# Patient Record
Sex: MALE | Race: WHITE | Employment: FULL TIME | URBAN - NONMETROPOLITAN AREA
[De-identification: names, ages, dates, MRNs, and addresses within clinical notes are randomized per-mention and may not be internally consistent; named-entity substitution may affect disease eponyms.]

---

## 2023-05-04 ENCOUNTER — OFFICE VISIT (OUTPATIENT)
Dept: PRIMARY CARE CLINIC | Age: 28
End: 2023-05-04

## 2023-05-04 VITALS
SYSTOLIC BLOOD PRESSURE: 126 MMHG | OXYGEN SATURATION: 98 % | DIASTOLIC BLOOD PRESSURE: 74 MMHG | HEART RATE: 70 BPM | RESPIRATION RATE: 16 BRPM | BODY MASS INDEX: 26.68 KG/M2 | HEIGHT: 72 IN | WEIGHT: 197 LBS

## 2023-05-04 DIAGNOSIS — M25.50 ARTHRALGIA, UNSPECIFIED JOINT: ICD-10-CM

## 2023-05-04 DIAGNOSIS — R10.11 RIGHT UPPER QUADRANT ABDOMINAL PAIN: ICD-10-CM

## 2023-05-04 DIAGNOSIS — Z00.8 ENCOUNTER FOR BIOMETRIC SCREENING: ICD-10-CM

## 2023-05-04 DIAGNOSIS — R53.83 FATIGUE, UNSPECIFIED TYPE: Primary | ICD-10-CM

## 2023-05-04 LAB
ALBUMIN SERPL BCG-MCNC: 5 G/DL (ref 3.5–5.1)
ALP SERPL-CCNC: 73 U/L (ref 38–126)
ALT SERPL W/O P-5'-P-CCNC: 37 U/L (ref 11–66)
AST SERPL-CCNC: 30 U/L (ref 5–40)
BASOPHILS ABSOLUTE: 0 THOU/MM3 (ref 0–0.1)
BASOPHILS NFR BLD AUTO: 0.5 %
BILIRUB CONJ SERPL-MCNC: < 0.2 MG/DL (ref 0–0.3)
BILIRUB SERPL-MCNC: 0.4 MG/DL (ref 0.3–1.2)
CHOLEST SERPL-MCNC: 215 MG/DL (ref 100–199)
CRP SERPL-MCNC: < 0.3 MG/DL (ref 0–1)
DEPRECATED RDW RBC AUTO: 41.7 FL (ref 35–45)
EOSINOPHIL NFR BLD AUTO: 1 %
EOSINOPHILS ABSOLUTE: 0.1 THOU/MM3 (ref 0–0.4)
ERYTHROCYTE [DISTWIDTH] IN BLOOD BY AUTOMATED COUNT: 12.6 % (ref 11.5–14.5)
ERYTHROCYTE [SEDIMENTATION RATE] IN BLOOD BY WESTERGREN METHOD: 2 MM/HR (ref 0–10)
HCT VFR BLD AUTO: 51.3 % (ref 42–52)
HDLC SERPL-MCNC: 58 MG/DL
HGB BLD-MCNC: 17.2 GM/DL (ref 14–18)
IMM GRANULOCYTES # BLD AUTO: 0.02 THOU/MM3 (ref 0–0.07)
IMM GRANULOCYTES NFR BLD AUTO: 0.2 %
LDLC SERPL CALC-MCNC: 134 MG/DL
LYMPHOCYTES ABSOLUTE: 1.9 THOU/MM3 (ref 1–4.8)
LYMPHOCYTES NFR BLD AUTO: 20.7 %
MCH RBC QN AUTO: 30.4 PG (ref 26–33)
MCHC RBC AUTO-ENTMCNC: 33.5 GM/DL (ref 32.2–35.5)
MCV RBC AUTO: 90.8 FL (ref 80–94)
MONOCYTES ABSOLUTE: 0.7 THOU/MM3 (ref 0.4–1.3)
MONOCYTES NFR BLD AUTO: 7.2 %
NEUTROPHILS NFR BLD AUTO: 70.4 %
NRBC BLD AUTO-RTO: 0 /100 WBC
PLATELET # BLD AUTO: 307 THOU/MM3 (ref 130–400)
PMV BLD AUTO: 10.2 FL (ref 9.4–12.4)
PROT SERPL-MCNC: 7.9 G/DL (ref 6.1–8)
RBC # BLD AUTO: 5.65 MILL/MM3 (ref 4.7–6.1)
SEGMENTED NEUTROPHILS ABSOLUTE COUNT: 6.5 THOU/MM3 (ref 1.8–7.7)
TRIGL SERPL-MCNC: 117 MG/DL (ref 0–199)
TSH SERPL DL<=0.005 MIU/L-ACNC: 1.29 UIU/ML (ref 0.4–4.2)
WBC # BLD AUTO: 9.2 THOU/MM3 (ref 4.8–10.8)

## 2023-05-04 RX ORDER — PREDNISONE 20 MG/1
40 TABLET ORAL DAILY
Qty: 10 TABLET | Refills: 0 | Status: SHIPPED | OUTPATIENT
Start: 2023-05-04 | End: 2023-05-09

## 2023-05-05 LAB
HAV IGM SER QL: NEGATIVE
HBV CORE IGM SERPL QL IA: NEGATIVE
HBV SURFACE AG SERPL QL IA: NEGATIVE
HCV IGG SERPL QL IA: NEGATIVE
RHEUMATOID FACT SERPL-ACNC: < 10 IU/ML (ref 0–13)
RPR SER QL: NONREACTIVE

## 2023-05-05 ASSESSMENT — ENCOUNTER SYMPTOMS
BACK PAIN: 1
CONSTIPATION: 0
CHEST TIGHTNESS: 0
PHOTOPHOBIA: 0
ABDOMINAL PAIN: 0
COLOR CHANGE: 0
SHORTNESS OF BREATH: 0
DIARRHEA: 0

## 2023-05-11 ENCOUNTER — OFFICE VISIT (OUTPATIENT)
Dept: PRIMARY CARE CLINIC | Age: 28
End: 2023-05-11

## 2023-05-11 VITALS
HEIGHT: 72 IN | HEART RATE: 77 BPM | BODY MASS INDEX: 26.68 KG/M2 | WEIGHT: 197 LBS | DIASTOLIC BLOOD PRESSURE: 74 MMHG | SYSTOLIC BLOOD PRESSURE: 118 MMHG | RESPIRATION RATE: 16 BRPM | OXYGEN SATURATION: 97 %

## 2023-05-11 DIAGNOSIS — M25.562 ACUTE PAIN OF BOTH KNEES: Primary | ICD-10-CM

## 2023-05-11 DIAGNOSIS — M25.561 ACUTE PAIN OF BOTH KNEES: Primary | ICD-10-CM

## 2023-05-11 RX ORDER — NAPROXEN 250 MG/1
250 TABLET ORAL 2 TIMES DAILY WITH MEALS
Qty: 60 TABLET | Refills: 0 | Status: SHIPPED | OUTPATIENT
Start: 2023-05-11

## 2023-05-11 ASSESSMENT — ENCOUNTER SYMPTOMS
DIARRHEA: 0
CHEST TIGHTNESS: 0
COLOR CHANGE: 0
PHOTOPHOBIA: 0
CONSTIPATION: 0
ABDOMINAL PAIN: 0
SHORTNESS OF BREATH: 0
BACK PAIN: 1

## 2023-05-11 NOTE — PROGRESS NOTES
Albumin 3.5 - 5.1 g/dL 5.0   Alk Phos 38 - 126 U/L 73   ALT 11 - 66 U/L 37   AST 5 - 40 U/L 30   BILIRUBIN TOTAL 0.3 - 1.2 mg/dL 0.4   Bilirubin, Direct 0.0 - 0.3 mg/dL <0.2   TSH 0.400 - 4.200 uIU/mL 1.290   WBC 4.8 - 10.8 thou/mm3 9.2   RBC 4.70 - 6.10 mill/mm3 5.65   Hemoglobin Quant 14.0 - 18.0 gm/dl 17.2   Hematocrit 42.0 - 52.0 % 51.3   MCV 80.0 - 94.0 fL 90.8   MCH 26.0 - 33.0 pg 30.4   MCHC 32.2 - 35.5 gm/dl 33.5   MPV 9.4 - 12.4 fL 10.2   RDW-CV 11.5 - 14.5 % 12.6   RDW-SD 35.0 - 45.0 fL 41.7   Platelet Count 724 - 400 thou/mm3 307   Lymphocytes Absolute 1.0 - 4.8 thou/mm3 1.9   Monocytes Absolute 0.4 - 1.3 thou/mm3 0.7   Eosinophils Absolute 0.0 - 0.4 thou/mm3 0.1   Basophils Absolute 0.0 - 0.1 thou/mm3 0.0   Seg Neutrophils % 70.4   Segs Absolute 1.8 - 7.7 thou/mm3 6.5   Lymphocytes % 20.7   Monocytes % 7.2   Eosinophils % 1.0   Basophils % 0.5   Immature Grans (Abs) 0.00 - 0.07 thou/mm3 0.02   Immature Granulocytes % 0.2   Nucleated Red Blood Cells /100 wbc 0   Sed Rate 0 - 10 mm/hr 2   Rheumatoid Factor 0 - 13 IU/mL < 10   Hep A IgM  Negative   Hepatitis B Surface Ag  Negative   Hepatitis C Ab  Negative   Hep B Core Ab, IgM  Negative   (H): Data is abnormally high                  Return in about 10 days (around 5/21/2023).   Orders Placed This Encounter   Procedures    XR KNEE RIGHT (3 VIEWS)     Standing Status:   Future     Standing Expiration Date:   5/11/2024     Order Specific Question:   Reason for exam:     Answer:   b knee pain    XR KNEE LEFT (3 VIEWS)     Standing Status:   Future     Standing Expiration Date:   6/11/2023     Order Specific Question:   Reason for exam:     Answer:   B knee pain           Electronically signed by SHREYA Dotson CNP on 5/11/2023 at 3:53 PM

## 2023-05-11 NOTE — PATIENT INSTRUCTIONS
All labs were essentially normal in addition to the ER workup  With persistent B joint pain will image look for any acute or degenerative changes  Discussed  adjusting anti inflammatory meds  No other nsaids with the use of the naprosyn  Taake the naprosyn with food

## 2023-05-16 ENCOUNTER — TELEPHONE (OUTPATIENT)
Dept: PRIMARY CARE CLINIC | Age: 28
End: 2023-05-16

## 2023-05-16 DIAGNOSIS — R29.2 HYPER REFLEXIA: ICD-10-CM

## 2023-05-16 DIAGNOSIS — M25.561 ACUTE PAIN OF BOTH KNEES: Primary | ICD-10-CM

## 2023-05-16 DIAGNOSIS — R20.2 PARESTHESIAS: ICD-10-CM

## 2023-05-16 DIAGNOSIS — M25.562 ACUTE PAIN OF BOTH KNEES: Primary | ICD-10-CM

## 2023-05-16 DIAGNOSIS — R29.898 LEG WEAKNESS, BILATERAL: ICD-10-CM

## 2023-05-16 NOTE — TELEPHONE ENCOUNTER
Patient stopped in requesting FMLA intermittent for his current condition. Patient was recently admitted and had several labs/imaging completed. Records pulled from Care everywhere. Patient okay with Neurology referral as discussed at the window.

## 2023-05-16 NOTE — PROGRESS NOTES
Urgent referral to neurology for progressing symptoms of leg weakness  Primary care work up in additional to hospital work up did not yield any significant dx  His leg weakness is becoming worse and making it difficult to ambulate. He has had serial ct and imaging as well as significant lab analysis for autoimmune, viral and metabolic conditions with no identifiable pathology    Pt needs additional Specialized evaluation possibly needle emg or muscle testing to determine cause of his b leg weakness. rule out neuromuscular disorder    Mri of spine was insignificant for acute pathology  Head imaging did not reveal any significant pathology or disease process  and labs are essentially normal with exception of low vitamin d. Pt was advised to seek FMLA so tht he can cover his illness and absences from work related to his limb weakness that is progressing  This started a few months ago, he presented for evaluation . 5/4 initiated work up started on NSAIDs and prednisone  due to what he reported as severe knee pain  He additionally had er work up before my initial new pt assessment for ruq abdominal pain      Today His gait and ambulation is ataxic. He was discharged from \[facility]\"" and pt ordered. Feel needs additional urgent work up at this time.         He was also advised to return  ER for any residual deficits worsening or progressing of symptoms

## 2023-05-23 ENCOUNTER — OFFICE VISIT (OUTPATIENT)
Dept: PRIMARY CARE CLINIC | Age: 28
End: 2023-05-23

## 2023-05-23 VITALS
WEIGHT: 199 LBS | DIASTOLIC BLOOD PRESSURE: 80 MMHG | SYSTOLIC BLOOD PRESSURE: 128 MMHG | RESPIRATION RATE: 16 BRPM | HEIGHT: 72 IN | BODY MASS INDEX: 26.95 KG/M2 | HEART RATE: 108 BPM | OXYGEN SATURATION: 99 %

## 2023-05-23 DIAGNOSIS — E55.9 VITAMIN D DEFICIENCY: Primary | ICD-10-CM

## 2023-05-23 DIAGNOSIS — M25.562 ACUTE PAIN OF BOTH KNEES: ICD-10-CM

## 2023-05-23 DIAGNOSIS — M25.561 ACUTE PAIN OF BOTH KNEES: ICD-10-CM

## 2023-05-23 RX ORDER — THIAMINE HCL 100 MG
2 TABLET ORAL 2 TIMES DAILY
COMMUNITY
Start: 2023-05-15 | End: 2023-06-14

## 2023-05-23 ASSESSMENT — PATIENT HEALTH QUESTIONNAIRE - PHQ9
SUM OF ALL RESPONSES TO PHQ QUESTIONS 1-9: 0
SUM OF ALL RESPONSES TO PHQ QUESTIONS 1-9: 0
2. FEELING DOWN, DEPRESSED OR HOPELESS: 0
1. LITTLE INTEREST OR PLEASURE IN DOING THINGS: 0
SUM OF ALL RESPONSES TO PHQ QUESTIONS 1-9: 0
SUM OF ALL RESPONSES TO PHQ9 QUESTIONS 1 & 2: 0
SUM OF ALL RESPONSES TO PHQ QUESTIONS 1-9: 0

## 2023-05-23 ASSESSMENT — ENCOUNTER SYMPTOMS
PHOTOPHOBIA: 0
SHORTNESS OF BREATH: 0
CHEST TIGHTNESS: 0
DIARRHEA: 0
CONSTIPATION: 0
ABDOMINAL PAIN: 0

## 2023-05-23 NOTE — PROGRESS NOTES
[address]  Dept: [phone]  Dept Fax: [phone]  Loc Appt: [phone]  Loc Fax: [phone]    Nereyda Walden is a 33 y.o. male who presents today for his medical conditions/complaints as noted below. Chief Complaint   Patient presents with    Follow-up     Follow up for bilateral knee pain, states he started to take Vitamin D supplement. Knee pain has been much improved since Friday. MRI reviewed  Vitamin D level 16  Mg, b12, calcium, TSH, CK, CBC, CMP wnl  HPI:     Sy Mac is following up for lower leg weakness and low back pain  Today he reports symptoms started improving Friday  He reports pain 1. Less leg buckling ans strength is improving  No numbness or tingling  Pt is reporting able to walk better he is able to climb stairs. Chronic low back discomfort  No changes to bowel and bladder      Current Outpatient Medications   Medication Sig Dispense Refill    Calcium Citrate-Vitamin D3 315-6.25 MG-MCG TABS Take 2 tablets by mouth 2 times daily      naproxen (NAPROSYN) 250 MG tablet Take 1 tablet by mouth 2 times daily (with meals) 60 tablet 0     No current facility-administered medications for this visit. No Known Allergies    Subjective:      Review of Systems   Constitutional:  Negative for chills, fatigue and fever. HENT:  Negative for congestion. Eyes:  Negative for photophobia and visual disturbance. Respiratory:  Negative for chest tightness and shortness of breath. Cardiovascular:  Negative for chest pain, palpitations and leg swelling. Gastrointestinal:  Negative for abdominal pain, constipation and diarrhea. Endocrine: Negative for polydipsia, polyphagia and polyuria. Genitourinary:  Negative for difficulty urinating. Skin:  Negative for rash. Allergic/Immunologic: Negative for environmental allergies.    Neurological:  Negative for dizziness, tremors,